# Patient Record
(demographics unavailable — no encounter records)

---

## 2025-03-05 NOTE — PLAN
[FreeTextEntry1] : 60 year  old female  presents for annual visit.   HCM -Pap/hpv done today  -Colon UTD -Mammo UTD - Pt to get dexa with next mammo   RTO 1yr annual

## 2025-03-05 NOTE — END OF VISIT
[FreeTextEntry3] :   I, Vidhiskyler Alvarado, acted as a scribe on behalf of Dr.Susan Eduard M.D  on 03/05/2025.   All medical entries made by the scribe were at my,  Dr.Susan Eduard M.D ,  direction and personally dictated by me on 03/05/2025. I have reviewed the chart and agree that the record accurately reflects my personal performance of the history, physical exam, assessment and plan. I have also personally directed, reviewed, and agreed with the chart.

## 2025-03-05 NOTE — HISTORY OF PRESENT ILLNESS
[FreeTextEntry1] : KOJO CHRIS is a 57 year old presenting for annual. Pt is doing well and has no complaints today   PMH: RA , Sjogren's syndrome Shx: 2022- 27 YO lives in [place] (girl), 29 YO   [Mammogramdate] : 1/2025 [PapSmeardate] : 6/2022 [BoneDensityDate] : 2/4/20 [ColonoscopyDate] : 2/7/17